# Patient Record
Sex: FEMALE | Race: OTHER | URBAN - METROPOLITAN AREA
[De-identification: names, ages, dates, MRNs, and addresses within clinical notes are randomized per-mention and may not be internally consistent; named-entity substitution may affect disease eponyms.]

---

## 2020-12-21 ENCOUNTER — EMERGENCY (EMERGENCY)
Facility: HOSPITAL | Age: 30
LOS: 1 days | Discharge: HOME | End: 2020-12-21
Attending: EMERGENCY MEDICINE
Payer: MEDICAID

## 2020-12-21 VITALS
OXYGEN SATURATION: 97 % | RESPIRATION RATE: 16 BRPM | HEIGHT: 62 IN | DIASTOLIC BLOOD PRESSURE: 79 MMHG | WEIGHT: 115.08 LBS | TEMPERATURE: 99 F | HEART RATE: 90 BPM | SYSTOLIC BLOOD PRESSURE: 115 MMHG

## 2020-12-21 PROCEDURE — U0003: CPT

## 2020-12-21 PROCEDURE — 99283 EMERGENCY DEPT VISIT LOW MDM: CPT

## 2020-12-21 PROCEDURE — 87631 RESP VIRUS 3-5 TARGETS: CPT

## 2020-12-21 NOTE — ED ADULT TRIAGE NOTE - CHIEF COMPLAINT QUOTE
Patient ambulated into ED with steady gait, Pt wants to be tested was exposed on Sat 12/12 to some who tested positive on 12/16.

## 2020-12-21 NOTE — ED PROVIDER NOTE - OBJECTIVE STATEMENT
Pt presenting to the ER for COVID-19 testing. Denies fevers chills, loss of taste or smell, URI symptoms, chest pain or shortness of breath, nausea vomiting diarrhea abdominal pain, weakness or fatigue. Eating and drinking normal diet. Normal output. Pt requesting testing at this time. + known exposure no travel.

## 2020-12-21 NOTE — ED PROVIDER NOTE - CLINICAL SUMMARY MEDICAL DECISION MAKING FREE TEXT BOX
Pt is a 30y F with no PMH presenting for covid swab s/p exposure on Saturday. Will swab and give return precautions.

## 2020-12-21 NOTE — ED PROVIDER NOTE - PHYSICAL EXAMINATION
Const: AOX3 nontoxic appearing, no apparent respiratory or physical distress. Stable gait   HEENT: Moist mucous membranes.  Eyes: EOMI  Neck: Soft and supple. Full ROM without pain.  Cardiac: Regular rate and regular rhythm. +S1/S2. No murmurs.   Resp: Speaking in full sentences. No evidence of respiratory distress. No wheezes, rales or rhonchi. No adventitious breath sounds   Abd: Soft, non-tender, non-distended. Normal bowel sounds in all 4 quadrants. No guarding or rebound.  Back: Spine midline and non-tender.   Skin: No rashes, abrasions or lacerations.  Neuro: Awake, alert & oriented x 3. Moves all extremities symmetrically.

## 2020-12-21 NOTE — ED PROVIDER NOTE - PROGRESS NOTE DETAILS
RVP and COVID swabs obtained.  Advised home quarantine until test results.  If test positive, requires home quarantine for 14-days.  Anticipatory guidance provided and supportive care recommended. Advised immediate return if worsening symptoms, especially if short of breath.

## 2024-01-01 NOTE — ED PROVIDER NOTE - PATIENT PORTAL LINK FT
If your baby has any of the following, CALL YOUR PEDIATRICIAN OR RETURN TO THE HOSPITAL You can access the FollowMyHealth Patient Portal offered by [facility] by registering at the following website: [email or web address]. By joining [facility]’s FollowMyHealth portal, you will also be able to view your health information using other applications (apps) compatible with our system.